# Patient Record
Sex: MALE | Race: WHITE
[De-identification: names, ages, dates, MRNs, and addresses within clinical notes are randomized per-mention and may not be internally consistent; named-entity substitution may affect disease eponyms.]

---
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
## 2023-01-29 ENCOUNTER — HOSPITAL ENCOUNTER (EMERGENCY)
Dept: HOSPITAL 46 - ED | Age: 43
Discharge: OTHER INSTITUTION | End: 2023-01-29
Payer: COMMERCIAL

## 2023-01-29 VITALS — WEIGHT: 189.99 LBS | BODY MASS INDEX: 26.6 KG/M2 | HEIGHT: 71 IN

## 2023-01-29 DIAGNOSIS — F10.120: ICD-10-CM

## 2023-01-29 DIAGNOSIS — R10.13: Primary | ICD-10-CM

## 2023-01-29 PROCEDURE — G0480 DRUG TEST DEF 1-7 CLASSES: HCPCS
